# Patient Record
Sex: FEMALE | Race: AMERICAN INDIAN OR ALASKA NATIVE
[De-identification: names, ages, dates, MRNs, and addresses within clinical notes are randomized per-mention and may not be internally consistent; named-entity substitution may affect disease eponyms.]

---

## 2019-08-11 ENCOUNTER — HOSPITAL ENCOUNTER (INPATIENT)
Dept: HOSPITAL 5 - ED | Age: 24
LOS: 2 days | Discharge: LEFT BEFORE DISCHARGE | DRG: 758 | End: 2019-08-13
Attending: INTERNAL MEDICINE | Admitting: INTERNAL MEDICINE
Payer: COMMERCIAL

## 2019-08-11 DIAGNOSIS — N30.00: ICD-10-CM

## 2019-08-11 DIAGNOSIS — N72: Primary | ICD-10-CM

## 2019-08-11 DIAGNOSIS — K52.9: ICD-10-CM

## 2019-08-11 DIAGNOSIS — R65.10: ICD-10-CM

## 2019-08-11 DIAGNOSIS — E86.0: ICD-10-CM

## 2019-08-11 DIAGNOSIS — N73.9: ICD-10-CM

## 2019-08-11 DIAGNOSIS — A54.9: ICD-10-CM

## 2019-08-11 LAB
ALBUMIN SERPL-MCNC: 4 G/DL (ref 3.9–5)
ALT SERPL-CCNC: 12 UNITS/L (ref 7–56)
BACTERIA #/AREA URNS HPF: (no result) /HPF
BAND NEUTROPHILS # (MANUAL): 0.6 K/MM3
BILIRUB UR QL STRIP: (no result)
BLOOD UR QL VISUAL: (no result)
BUN SERPL-MCNC: 13 MG/DL (ref 7–17)
BUN/CREAT SERPL: 14 %
CALCIUM SERPL-MCNC: 9.2 MG/DL (ref 8.4–10.2)
HCT VFR BLD CALC: 36.2 % (ref 30.3–42.9)
HEMOLYSIS INDEX: 6
HGB BLD-MCNC: 12.1 GM/DL (ref 10.1–14.3)
MCHC RBC AUTO-ENTMCNC: 33 % (ref 30–34)
MCV RBC AUTO: 92 FL (ref 79–97)
MUCOUS THREADS #/AREA URNS HPF: (no result) /HPF
MYELOCYTES # (MANUAL): 0 K/MM3
PH UR STRIP: 6 [PH] (ref 5–7)
PLATELET # BLD: 206 K/MM3 (ref 140–440)
PROMYELOCYTES # (MANUAL): 0 K/MM3
RBC # BLD AUTO: 3.92 M/MM3 (ref 3.65–5.03)
RBC #/AREA URNS HPF: 16 /HPF (ref 0–6)
TOTAL CELLS COUNTED BLD: 100
UROBILINOGEN UR-MCNC: < 2 MG/DL (ref ?–2)
WBC #/AREA URNS HPF: > 182 /HPF (ref 0–6)

## 2019-08-11 PROCEDURE — 85007 BL SMEAR W/DIFF WBC COUNT: CPT

## 2019-08-11 PROCEDURE — 87040 BLOOD CULTURE FOR BACTERIA: CPT

## 2019-08-11 PROCEDURE — 84703 CHORIONIC GONADOTROPIN ASSAY: CPT

## 2019-08-11 PROCEDURE — 80053 COMPREHEN METABOLIC PANEL: CPT

## 2019-08-11 PROCEDURE — 76856 US EXAM PELVIC COMPLETE: CPT

## 2019-08-11 PROCEDURE — 81001 URINALYSIS AUTO W/SCOPE: CPT

## 2019-08-11 PROCEDURE — 36415 COLL VENOUS BLD VENIPUNCTURE: CPT

## 2019-08-11 PROCEDURE — 74177 CT ABD & PELVIS W/CONTRAST: CPT

## 2019-08-11 PROCEDURE — 87591 N.GONORRHOEAE DNA AMP PROB: CPT

## 2019-08-11 PROCEDURE — 85025 COMPLETE CBC W/AUTO DIFF WBC: CPT

## 2019-08-11 PROCEDURE — 82140 ASSAY OF AMMONIA: CPT

## 2019-08-11 PROCEDURE — 87210 SMEAR WET MOUNT SALINE/INK: CPT

## 2019-08-11 PROCEDURE — 85027 COMPLETE CBC AUTOMATED: CPT

## 2019-08-11 PROCEDURE — 87086 URINE CULTURE/COLONY COUNT: CPT

## 2019-08-11 PROCEDURE — 87806 HIV AG W/HIV1&2 ANTB W/OPTIC: CPT

## 2019-08-11 PROCEDURE — 83690 ASSAY OF LIPASE: CPT

## 2019-08-11 PROCEDURE — 86592 SYPHILIS TEST NON-TREP QUAL: CPT

## 2019-08-11 PROCEDURE — 99406 BEHAV CHNG SMOKING 3-10 MIN: CPT

## 2019-08-11 PROCEDURE — 74018 RADEX ABDOMEN 1 VIEW: CPT

## 2019-08-11 RX ADMIN — SODIUM CHLORIDE SCH MLS/HR: 0.9 INJECTION, SOLUTION INTRAVENOUS at 23:21

## 2019-08-11 RX ADMIN — CEFEPIME SCH MLS/HR: 2 INJECTION, POWDER, FOR SOLUTION INTRAVENOUS at 22:30

## 2019-08-11 RX ADMIN — Medication SCH ML: at 22:30

## 2019-08-11 RX ADMIN — MORPHINE SULFATE PRN MG: 2 INJECTION, SOLUTION INTRAMUSCULAR; INTRAVENOUS at 23:21

## 2019-08-11 NOTE — CAT SCAN REPORT
CT ABDOMEN AND PELVIS WITH  IV CONTRAST



INDICATION:

suprapubic pain.



COMPARISON:

None available.



TECHNIQUE:

All CT scans at this facility use dose modulation, automated exposure control, iterative reconstructi
on or weight based dosing, when appropriate, to reduce radiation dose to as low as reasonably achieva
ble.



FINDINGS:



Lung Bases: Clear.



Skeletal System: No acute abnormality.



ABDOMEN:

Liver: Normal.

Gallbladder: Normal.  

Bile Ducts: Normal.

Pancreas: Normal.

Spleen: Normal.

Adrenals: Normal.

Right Kidney: Normal.

Left Kidney: Normal.

Stomach and Bowel: Distal loops of small bowel are mildly distended with fluid.

Lymph Nodes: No significant adenopathy.

Aorta: No significant abnormality. 

Additional Findings: None.



PELVIS:

Colon: Proximal colon is fluid-filled. There is no colonic inflammation.

Urinary Bladder and Distal Ureters: Bladder is decompressed, limiting its evaluation.

Appendix: Normal.  

Lymph Nodes: No significant adenopathy.

Additional Findings: There is mild enhancement along the periphery of the uterus. The endometrium donte
ears thickened. Cervix is thickened. There is gas in the vaginal vault. There is mild stranding aroun
d the cervix in the pelvis.





IMPRESSION:

1.  There appears be mild inflammation involving cervix and uterus. Correlate clinically for cervicit
is or pelvic inflammatory disease. No hydrosalpinx is seen.

2.  Distal small bowel is mildly fluid-filled, as is the proximal colon. This could be reactive ileus
 related to the pelvic inflammatory process. Enteritis could have this appearance.



Signer Name: Soham Herrera MD 

Signed: 8/11/2019 2:07 PM

 Workstation Name: [id number]

## 2019-08-11 NOTE — EMERGENCY DEPARTMENT REPORT
ED Abdominal Pain HPI





- General


Chief Complaint: Abdominal Pain


Stated Complaint: ABD PAIN


Time Seen by Provider: 08/11/19 09:40


Source: EMS


Mode of arrival: Stretcher


Limitations: No Limitations





- History of Present Illness


Initial Comments: 


24-year-old female reports the onset of lower abdominal bilateral pain this 

morning.  She states that her period was "different" and ended yesterday but was

on time.  Not exactly sure what she means by different.  She does not refer 

acute vaginal discharge.  She denies fever.  She does refer difficulty with urin

ating and mentioned constipation at triage.  She said no vomiting.  She is not 

currently reporting nausea.  He is a bit emotionally labile at the time of my 

encounter.  However, she is able to tell me that she has never come to the 

emergency department for evaluation of abdominal pain.  She states she had a 

child 2 years ago but no prior abdominal surgery.





MD Complaint: abdominal pain


-: Gradual


Location: LLQ, RLQ


Radiation: none


Migration to: no migration


Severity: moderate, severe


Quality: aching


Consistency: constant


Improves With: nothing


Worsens With: nothing


Associated Symptoms: constipation, dysuria





- Related Data


LMP (females 10-50): this week


                                    Allergies











Allergy/AdvReac Type Severity Reaction Status Date / Time


 


No Known Allergies Allergy   Unverified 08/11/19 09:36














ED Review of Systems


ROS: 


Stated complaint: ABD PAIN


Other details as noted in HPI





Constitutional: denies: chills, fever


Eyes: denies: eye pain, eye discharge, vision change


ENT: denies: ear pain, throat pain


Respiratory: denies: cough, shortness of breath, wheezing


Cardiovascular: denies: chest pain, palpitations


Endocrine: no symptoms reported


Gastrointestinal: abdominal pain.  denies: nausea, diarrhea


Genitourinary: dysuria.  denies: urgency, discharge


Musculoskeletal: denies: back pain, joint swelling, arthralgia


Skin: denies: rash, lesions


Neurological: denies: headache, weakness, paresthesias


Psychiatric: denies: anxiety, depression


Hematological/Lymphatic: denies: easy bleeding, easy bruising





ED Past Medical Hx





- Past Medical History


Previous Medical History?: No





- Surgical History


Past Surgical History?: No





- Social History


Smoking Status: Never Smoker





ED Physical Exam





- General


Limitations: No Limitations


General appearance: alert, in no apparent distress





- Head


Head exam: Present: atraumatic, normocephalic





- Eye


Eye exam: Present: normal appearance.  Absent: scleral icterus





- ENT


ENT exam: Present: mucous membranes moist





- Neck


Neck exam: Present: normal inspection





- Respiratory


Respiratory exam: Present: normal lung sounds bilaterally.  Absent: respiratory 

distress





- Cardiovascular


Cardiovascular Exam: Present: regular rate, normal rhythm.  Absent: systolic 

murmur, diastolic murmur, rubs, gallop





- GI/Abdominal


GI/Abdominal exam: Present: soft, tenderness (lower quadrants, poorly 

localized), normal bowel sounds.  Absent: distended, guarding, rebound, rigid





- 


Speculum exam: Present: normal speculum exam (minimal amount of mucoid 

discharge:)


Bi-manual exam: Present: adnexal tenderness.  Absent: cervical motion tendernes,

uterine enlargement





- Extremities Exam


Extremities exam: Present: normal inspection





- Back Exam


Back exam: Present: normal inspection





- Neurological Exam


Neurological exam: Present: alert, oriented X3, CN II-XII intact.  Absent: motor

sensory deficit





- Psychiatric


Psychiatric exam: Present: normal affect, anxious





- Skin


Skin exam: Present: warm, dry, intact, normal color.  Absent: rash





ED Course


                                   Vital Signs











  08/11/19 08/11/19 08/11/19





  09:34 09:36 10:15


 


Temperature 98.5 F 98 F 


 


Pulse Rate 62 78 


 


Respiratory 17 16 20





Rate   


 


Blood Pressure 127/65  


 


Blood Pressure  106/78 





[Right]   


 


O2 Sat by Pulse 100 97 





Oximetry   














  08/11/19 08/11/19 08/11/19





  10:17 10:30 10:45


 


Temperature   


 


Pulse Rate 79 82 


 


Respiratory 15 20 20





Rate   


 


Blood Pressure  99/41 


 


Blood Pressure   





[Right]   


 


O2 Sat by Pulse 100 100 98





Oximetry   














  08/11/19 08/11/19 08/11/19





  11:00 12:11 13:00


 


Temperature   


 


Pulse Rate 85 88 92 H


 


Respiratory 19 15 21





Rate   


 


Blood Pressure 114/58 115/62 123/68


 


Blood Pressure   





[Right]   


 


O2 Sat by Pulse 99 100 100





Oximetry   














  08/11/19





  14:12


 


Temperature 


 


Pulse Rate 


 


Respiratory 





Rate 


 


Blood Pressure 126/59


 


Blood Pressure 





[Right] 


 


O2 Sat by Pulse 98





Oximetry 














- Reevaluation(s)


Reevaluation #1: 


Persistent suprapubic discomfort.  And mild tenderness on exam.  Regard to give 

the patient ceftriaxone.


Asked the nurses to obtain a urine specimen.  She will go to CT scan.


08/11/19 13:05





Reevaluation #2: 


Patient had episodes of fecal incontinence.  She is volume depleted and having 

diarrhea.  She is really quite uncomfortable.  Her CT was suggestive of ente

ritis versus PID or both.  Her exam was not early consistent with PID.  She 

certainly has acute cystitis.  She'll be admitted for intravenous antibiotic 

therapy IV fluids and further evaluation by hospitalist service.


08/11/19 15:39











ED Medical Decision Making





- Lab Data


Result diagrams: 


                                 08/11/19 09:48





                                 08/11/19 09:48








                         Laboratory Results - last 24 hr











  08/11/19 08/11/19 08/11/19





  09:48 09:48 09:48


 


WBC  14.2 H  


 


RBC  3.92  


 


Hgb  12.1  


 


Hct  36.2  


 


MCV  92  


 


MCH  31  


 


MCHC  33  


 


RDW  13.6  


 


Plt Count  206  


 


Add Manual Diff  Complete  


 


Total Counted  100  


 


Seg Neutrophils %  Np  


 


Seg Neuts % (Manual)  91.0 H  


 


Band Neutrophils %  4.0  


 


Lymphocytes % (Manual)  4.0 L  


 


Reactive Lymphs % (Man)  0  


 


Monocytes % (Manual)  1.0  


 


Eosinophils % (Manual)  0  


 


Basophils % (Manual)  0  


 


Metamyelocytes %  0  


 


Myelocytes %  0  


 


Promyelocytes %  0  


 


Blast Cells %  0  


 


Nucleated RBC %  Not Reportable  


 


Seg Neutrophils # Man  12.9 H  


 


Band Neutrophils #  0.6  


 


Lymphocytes # (Manual)  0.6 L  


 


Abs React Lymphs (Man)  0.0  


 


Monocytes # (Manual)  0.1  


 


Eosinophils # (Manual)  0.0  


 


Basophils # (Manual)  0.0  


 


Metamyelocytes #  0.0  


 


Myelocytes #  0.0  


 


Promyelocytes #  0.0  


 


Blast Cells #  0.0  


 


WBC Morphology  Not Reportable  


 


Hypersegmented Neuts  Not Reportable  


 


Hyposegmented Neuts  Not Reportable  


 


Hypogranular Neuts  Not Reportable  


 


Smudge Cells  Not Reportable  


 


Toxic Granulation  Not Reportable  


 


Toxic Vacuolation  Not Reportable  


 


Dohle Bodies  Not Reportable  


 


Pelger-Huet Anomaly  Not Reportable  


 


Francis Rods  Not Reportable  


 


Platelet Estimate  Consistent w auto  


 


Clumped Platelets  Not Reportable  


 


Plt Clumps, EDTA  Not Reportable  


 


Large Platelets  Not Reportable  


 


Giant Platelets  Not Reportable  


 


Platelet Satelliting  Not Reportable  


 


Plt Morphology Comment  Not Reportable  


 


RBC Morphology  Normal  


 


Dimorphic RBCs  Not Reportable  


 


Polychromasia  Not Reportable  


 


Hypochromasia  Not Reportable  


 


Poikilocytosis  Not Reportable  


 


Anisocytosis  Not Reportable  


 


Microcytosis  Not Reportable  


 


Macrocytosis  Not Reportable  


 


Spherocytes  Not Reportable  


 


Pappenheimer Bodies  Not Reportable  


 


Sickle Cells  Not Reportable  


 


Target Cells  Not Reportable  


 


Tear Drop Cells  Not Reportable  


 


Ovalocytes  Not Reportable  


 


Helmet Cells  Not Reportable  


 


[place] Bodies  Not Reportable  


 


Cabot Rings  Not Reportable  


 


Savery Cells  Not Reportable  


 


Bite Cells  Not Reportable  


 


Crenated Cell  Not Reportable  


 


Elliptocytes  Not Reportable  


 


Acanthocytes (Spur)  Not Reportable  


 


Rouleaux  Not Reportable  


 


Hemoglobin C Crystals  Not Reportable  


 


Schistocytes  Not Reportable  


 


Malaria parasites  Not Reportable  


 


Juan Manuel Bodies  Not Reportable  


 


Hem Pathologist Commnt  No  


 


Sodium   


 


Potassium   


 


Chloride   


 


Carbon Dioxide   


 


Anion Gap   


 


BUN   


 


Creatinine   


 


Estimated GFR   


 


BUN/Creatinine Ratio   


 


Glucose   


 


Calcium   


 


Total Bilirubin   


 


AST   


 


ALT   


 


Alkaline Phosphatase   


 


Total Protein   


 


Albumin   


 


Albumin/Globulin Ratio   


 


Lipase   21 


 


HCG, Qual    Negative


 


Urine Color   


 


Urine Turbidity   


 


Urine pH   


 


Ur Specific Gravity   


 


Urine Protein   


 


Urine Glucose (UA)   


 


Urine Ketones   


 


Urine Blood   


 


Urine Nitrite   


 


Urine Bilirubin   


 


Urine Urobilinogen   


 


Ur Leukocyte Esterase   


 


Urine WBC (Auto)   


 


Urine RBC (Auto)   


 


U Epithel Cells (Auto)   


 


Urine Bacteria (Auto)   


 


Urine Mucus   














  08/11/19 08/11/19





  09:48 13:30


 


WBC  


 


RBC  


 


Hgb  


 


Hct  


 


MCV  


 


MCH  


 


MCHC  


 


RDW  


 


Plt Count  


 


Add Manual Diff  


 


Total Counted  


 


Seg Neutrophils %  


 


Seg Neuts % (Manual)  


 


Band Neutrophils %  


 


Lymphocytes % (Manual)  


 


Reactive Lymphs % (Man)  


 


Monocytes % (Manual)  


 


Eosinophils % (Manual)  


 


Basophils % (Manual)  


 


Metamyelocytes %  


 


Myelocytes %  


 


Promyelocytes %  


 


Blast Cells %  


 


Nucleated RBC %  


 


Seg Neutrophils # Man  


 


Band Neutrophils #  


 


Lymphocytes # (Manual)  


 


Abs React Lymphs (Man)  


 


Monocytes # (Manual)  


 


Eosinophils # (Manual)  


 


Basophils # (Manual)  


 


Metamyelocytes #  


 


Myelocytes #  


 


Promyelocytes #  


 


Blast Cells #  


 


WBC Morphology  


 


Hypersegmented Neuts  


 


Hyposegmented Neuts  


 


Hypogranular Neuts  


 


Smudge Cells  


 


Toxic Granulation  


 


Toxic Vacuolation  


 


Dohle Bodies  


 


Pelger-Huet Anomaly  


 


Francis Rods  


 


Platelet Estimate  


 


Clumped Platelets  


 


Plt Clumps, EDTA  


 


Large Platelets  


 


Giant Platelets  


 


Platelet Satelliting  


 


Plt Morphology Comment  


 


RBC Morphology  


 


Dimorphic RBCs  


 


Polychromasia  


 


Hypochromasia  


 


Poikilocytosis  


 


Anisocytosis  


 


Microcytosis  


 


Macrocytosis  


 


Spherocytes  


 


Pappenheimer Bodies  


 


Sickle Cells  


 


Target Cells  


 


Tear Drop Cells  


 


Ovalocytes  


 


Helmet Cells  


 


[place] Bodies  


 


Cabot Rings  


 


Yordy Cells  


 


Bite Cells  


 


Crenated Cell  


 


Elliptocytes  


 


Acanthocytes (Spur)  


 


Rouleaux  


 


Hemoglobin C Crystals  


 


Schistocytes  


 


Malaria parasites  


 


Juan Manuel Bodies  


 


Hem Pathologist Commnt  


 


Sodium  138 


 


Potassium  4.3 


 


Chloride  104.5 


 


Carbon Dioxide  22 


 


Anion Gap  16 


 


BUN  13 


 


Creatinine  0.9 


 


Estimated GFR  > 60 


 


BUN/Creatinine Ratio  14 


 


Glucose  116 H 


 


Calcium  9.2 


 


Total Bilirubin  0.60 


 


AST  15 


 


ALT  12 


 


Alkaline Phosphatase  55 


 


Total Protein  6.9 


 


Albumin  4.0 


 


Albumin/Globulin Ratio  1.4 


 


Lipase  


 


HCG, Qual  


 


Urine Color   Yellow


 


Urine Turbidity   Cloudy


 


Urine pH   6.0


 


Ur Specific Gravity   1.013


 


Urine Protein   30 mg/dl


 


Urine Glucose (UA)   Neg


 


Urine Ketones   20


 


Urine Blood   Mod


 


Urine Nitrite   Neg


 


Urine Bilirubin   Neg


 


Urine Urobilinogen   < 2.0


 


Ur Leukocyte Esterase   Lg


 


Urine WBC (Auto)   > 182.0 H


 


Urine RBC (Auto)   16.0


 


U Epithel Cells (Auto)   9.0


 


Urine Bacteria (Auto)   1+


 


Urine Mucus   Few














- Radiology Data





IMPRESSION: 


1. There appears be mild inflammation involving cervix and uterus. Correlate 

clinically for 


cervicitis or pelvic inflammatory disease. No hydrosalpinx is seen. 


2. Distal small bowel is mildly fluid-filled, as is the proximal colon. This 

could be reactive 


ileus related to the pelvic inflammatory process. Enteritis could have this 

appearance. 





Signer Name: Soham Herrera MD 


Ultrasound no acute process


Critical care attestation.: 


If time is entered above; I have spent that time in minutes in the direct care 

of this critically ill patient, excluding procedure time.








ED Disposition


Clinical Impression: 


 Enteritis, Dehydration





Acute cystitis


Qualifiers:


 Hematuria presence: with hematuria Qualified Code(s): N30.01 - Acute cystitis 

with hematuria





Disposition: DC-09 OP ADMIT IP TO THIS HOSP


Is pt being admited?: Yes


Does the pt Need Aspirin: No


Condition: Stable


Instructions:  Abdominal Pain (ED)


Referrals: 


[facility], [place] [Primary Care Provider] - 3-5 Days


Time of Disposition: 15:42

## 2019-08-11 NOTE — ULTRASOUND REPORT
ULTRASOUND PELVIS  



INDICATION / CLINICAL INFORMATION: 

lower abd pain.



TECHNIQUE:

Transabdominal.

Duplex Color Doppler used: Yes. 



COMPARISON: 

None available



FINDINGS:

UTERUS: Present.  

- Appearance (if present): No significant abnormality.

- Size in cm (if present): 11.8 x 5.6 x 5.9. 

- Endometrial Complex (if present): No significant abnormality.. Thickness in cm (if measured) = 1.3

- Mass lesions: None.

- Additional findings: None.



RIGHT ADNEXA: Small 1 cm right ovarian cyst. Normal color Doppler blood flow.



LEFT ADNEXA: No significant ovarian cyst or mass. Normal color Doppler blood flow.



URINARY BLADDER: No significant abnormality. 

FREE FLUID: None.

ADDITIONAL FINDINGS: None.



IMPRESSION:

1. No acute sonographic abnormality of the pelvis.

2. 1 cm right ovarian cyst is almost certainly benign and requires no follow-up.



Signer Name: AG Fontaine MD 

Signed: 8/11/2019 12:48 PM

 Workstation Name: [id number]

## 2019-08-11 NOTE — HISTORY AND PHYSICAL REPORT
History of Present Illness


Chief complaint: 





Im hurting down there


History of present illness: 


25 YO Female with no PMH presents to ED for evaluation. Pt states that she has 

experienced pain in her abdomen over the past 1 day with persistently worsening 

symptoms over the past 8 hours. Pt states that her LNMP began 6 days ago, and 

ended yesterday, but "was Different". Pt acknowledges thick vaginal discharge, 

as well as pain with urination. 








24-year-old female reports the onset of lower abdominal bilateral pain this 

morning.  She states that her period was "different" and ended yesterday but was

on time.  Not exactly sure what she means by different.  She does not refer 

acute vaginal discharge.  She denies fever.  She does refer difficulty with 

urinating and mentioned constipation at triage.  She said no vomiting.  She is 

not currently reporting nausea.  He is a bit emotionally labile at the time of 

my encounter.  However, she is able to tell me that she has never come to the 

emergency department for evaluation of abdominal pain.  She states she had a 

child 2 years ago but no prior abdominal surgery.





MD Complaint: abdominal pain


-: Gradual


Location: LLQ, RLQ


Radiation: none


Migration to: no migration


Severity: moderate, severe


Quality: aching


Consistency: constant


Improves With: nothing


Worsens With: nothing


Associated Symptoms: constipation, dysuria





- Related Data


LMP (females 10-50): this week


                                    Allergies











Allergy/AdvReac Type Severity Reaction Status Date / Time


 


No Known Allergies Allergy   Unverified 08/11/19 09:36














ED Review of Systems


ROS: 


Stated complaint: ABD PAIN


Other details as noted in HPI





Constitutional: denies: chills, fever


Eyes: denies: eye pain, eye discharge, vision change


ENT: denies: ear pain, throat pain


Respiratory: denies: cough, shortness of breath, wheezing


Cardiovascular: denies: chest pain, palpitations


Endocrine: no symptoms reported


Gastrointestinal: abdominal pain.  denies: nausea, diarrhea


Genitourinary: dysuria.  denies: urgency, discharge


Musculoskeletal: denies: back pain, joint swelling, arthralgia


Skin: denies: rash, lesions


Neurological: denies: headache, weakness, paresthesias


Psychiatric: denies: anxiety, depression


Hematological/Lymphatic: denies: easy bleeding, easy bruising





ED Past Medical Hx





- Past Medical History


Previous Medical History?: No





- Surgical History


Past Surgical History?: No





- Social History


Smoking Status: Never Smoker








Past History


Past Medical History: No medical history, other (reviewed)


Past Surgical History: No surgical history, Other (Reviewed)


Social history: single.  denies: smoking, alcohol abuse, prescription drug abuse


Family history: no significant family history (reviewed)





Medications and Allergies


                                    Allergies











Allergy/AdvReac Type Severity Reaction Status Date / Time


 


No Known Allergies Allergy   Unverified 08/11/19 09:36














Review of Systems


Constitutional: no weight loss


Ears, nose, mouth and throat: no ear pain, no ear discharge, no decreased 

hearing, no nasal congestion, no nasal discharge


Breasts: no change in shape, no swelling, no mass


Cardiovascular: no chest pain, no orthopnea, no palpitations, no rapid/irregular

heart beat, no edema


Respiratory: no cough, no cough with sputum, no hemoptysis, no shortness of 

breath


Gastrointestinal: no abdominal pain, no vomiting, no diarrhea, no hematemesis


Genitourinary Female: pelvic pain, dysuria, vaginal discharge, no urinary 

frequency, no stress incontinence, no post void dribbling, no vaginal itching, 

no vaginal odor, no genital sores, no vaginal dryness


Rectal: no pain, no incontinence, no bleeding


Musculoskeletal: no neck stiffness, no neck pain, no shooting arm pain, no arm 

numbness/tingling, no low back pain, no leg numbness/tingling


Integumentary: no rash, no redness, no sores, no wounds


Neurological: no head injury, no paralysis, no weakness, no numbness, no 

tingling, no syncope, no tremors


Psychiatric: no anxiety, no memory loss, no change in sleep habits, no sleep 

disturbances, no insomnia, no hypersomnia





Exam





- Constitutional


Vitals: 


                                        











Temp Pulse Resp BP Pulse Ox


 


 98 F   92 H  21   126/59   98 


 


 08/11/19 09:36  08/11/19 13:00  08/11/19 13:00  08/11/19 14:12  08/11/19 14:12











General appearance: Present: mild distress





- EENT


Eyes: Present: PERRL


ENT: hearing intact, clear oral mucosa





- Neck


Neck: Present: supple, normal ROM





- Respiratory


Respiratory effort: normal


Respiratory: bilateral: CTA





- Cardiovascular


Heart Sounds: Present: S1 & S2.  Absent: rub, click





- Extremities


Extremities: pulses symmetrical, No edema


Peripheral Pulses: within normal limits





- Abdominal


General gastrointestinal: Present: soft, tender, non-distended, normal bowel 

sounds, other (mild suprapubic tenderness).  Absent: absent bowel sounds, 

hepatomegaly, splenomegaly


Female genitourinary: Present: normal





- Integumentary


Integumentary: Present: clear, warm, dry





- Musculoskeletal


Musculoskeletal: gait normal, strength equal bilaterally





- Psychiatric


Psychiatric: appropriate mood/affect, intact judgment & insight





- Neurologic


Neurologic: CNII-XII intact, moves all extremities





Results





- Labs


CBC & Chem 7: 


                                 08/11/19 09:48





                                 08/11/19 09:48


Labs: 


                              Abnormal lab results











  08/11/19 08/11/19 08/11/19 Range/Units





  09:48 09:48 13:30 


 


WBC  14.2 H    (4.5-11.0)  K/mm3


 


Seg Neuts % (Manual)  91.0 H    (40.0-70.0)  %


 


Lymphocytes % (Manual)  4.0 L    (13.4-35.0)  %


 


Seg Neutrophils # Man  12.9 H    (1.8-7.7)  K/mm3


 


Lymphocytes # (Manual)  0.6 L    (1.2-5.4)  K/mm3


 


Glucose   116 H   ()  mg/dL


 


Urine WBC (Auto)    > 182.0 H  (0.0-6.0)  /HPF














Assessment and Plan





- Patient Problems


(1) UTI (urinary tract infection)


Current Visit: Yes   Status: Acute   


Qualifiers: 


   Encounter type: initial encounter 


Plan to address problem: 


IV antibiotic therapy, urinalysis, CBC, CMP, supportive care. 








(2) SIRS (systemic inflammatory response syndrome)


Current Visit: Yes   Status: Acute   


Plan to address problem: 


IV antibiotic therapy, GC/Chlamydia,CBC, CMP, chest x ray, serial lactic acid, 

CT Abdomen, Pelvic Ultrasound.








(3) Cervicitis


Current Visit: Yes   Status: Acute   


Plan to address problem: 


Discussed patient with GYN physician on call. Recommend hospitalist service 

admit, and continue abx therapy. No further GYN input.








(4) DVT prophylaxis


Current Visit: Yes   Status: Acute   


Plan to address problem: 


SCD to BLE while in bed

## 2019-08-12 LAB
HCT VFR BLD CALC: 31.5 % (ref 30.3–42.9)
HGB BLD-MCNC: 10.3 GM/DL (ref 10.1–14.3)
MCHC RBC AUTO-ENTMCNC: 33 % (ref 30–34)
MCV RBC AUTO: 92 FL (ref 79–97)
PLATELET # BLD: 165 K/MM3 (ref 140–440)
RBC # BLD AUTO: 3.44 M/MM3 (ref 3.65–5.03)

## 2019-08-12 RX ADMIN — METRONIDAZOLE SCH MLS/HR: 5 INJECTION, SOLUTION INTRAVENOUS at 21:50

## 2019-08-12 RX ADMIN — Medication SCH ML: at 10:00

## 2019-08-12 RX ADMIN — MORPHINE SULFATE PRN MG: 2 INJECTION, SOLUTION INTRAMUSCULAR; INTRAVENOUS at 21:49

## 2019-08-12 RX ADMIN — Medication SCH ML: at 21:50

## 2019-08-12 RX ADMIN — CEFEPIME SCH MLS/HR: 2 INJECTION, POWDER, FOR SOLUTION INTRAVENOUS at 10:00

## 2019-08-12 RX ADMIN — METRONIDAZOLE SCH MLS/HR: 5 INJECTION, SOLUTION INTRAVENOUS at 12:00

## 2019-08-12 RX ADMIN — MORPHINE SULFATE PRN MG: 2 INJECTION, SOLUTION INTRAMUSCULAR; INTRAVENOUS at 15:56

## 2019-08-12 RX ADMIN — CEFEPIME SCH MLS/HR: 2 INJECTION, POWDER, FOR SOLUTION INTRAVENOUS at 21:50

## 2019-08-12 RX ADMIN — SODIUM CHLORIDE SCH MLS/HR: 0.9 INJECTION, SOLUTION INTRAVENOUS at 07:46
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
## 2019-08-12 NOTE — XRAY REPORT
. ABDOMEN 1 VIEW(S)



INDICATION / CLINICAL INFORMATION:

ILIUS.



COMPARISON: 

None available.



FINDINGS:



TUBES / LINES: None.

BOWEL GAS PATTERN/EXTRALUMINAL GAS: No significant abnormality. No pneumatosis or secondary signs of 
free air.



ADDITIONAL FINDINGS: No significant additional findings.



IMPRESSION:

1. No significant abnormality.



Signer Name: Rudy Donovan MD 

Signed: 8/12/2019 11:08 AM

 Workstation Name: [id number]

## 2019-08-13 VITALS — DIASTOLIC BLOOD PRESSURE: 61 MMHG | SYSTOLIC BLOOD PRESSURE: 133 MMHG

## 2019-08-13 LAB
BASOPHILS # (AUTO): 0.1 K/MM3 (ref 0–0.1)
BASOPHILS NFR BLD AUTO: 0.7 % (ref 0–1.8)
EOSINOPHIL # BLD AUTO: 0.1 K/MM3 (ref 0–0.4)
EOSINOPHIL NFR BLD AUTO: 0.8 % (ref 0–4.3)
HCT VFR BLD CALC: 30 % (ref 30.3–42.9)
HGB BLD-MCNC: 10.1 GM/DL (ref 10.1–14.3)
LYMPHOCYTES # BLD AUTO: 1.2 K/MM3 (ref 1.2–5.4)
LYMPHOCYTES NFR BLD AUTO: 8.4 % (ref 13.4–35)
MCHC RBC AUTO-ENTMCNC: 34 % (ref 30–34)
MCV RBC AUTO: 91 FL (ref 79–97)
MONOCYTES # (AUTO): 0.5 K/MM3 (ref 0–0.8)
MONOCYTES % (AUTO): 3.7 % (ref 0–7.3)
PLATELET # BLD: 168 K/MM3 (ref 140–440)
RBC # BLD AUTO: 3.3 M/MM3 (ref 3.65–5.03)

## 2019-08-13 RX ADMIN — CEFEPIME SCH MLS/HR: 2 INJECTION, POWDER, FOR SOLUTION INTRAVENOUS at 10:47

## 2019-08-13 RX ADMIN — MORPHINE SULFATE PRN MG: 2 INJECTION, SOLUTION INTRAMUSCULAR; INTRAVENOUS at 01:45

## 2019-08-13 RX ADMIN — Medication SCH ML: at 10:46

## 2019-08-13 RX ADMIN — METRONIDAZOLE SCH MLS/HR: 5 INJECTION, SOLUTION INTRAVENOUS at 13:38

## 2019-08-13 RX ADMIN — METRONIDAZOLE SCH MLS/HR: 5 INJECTION, SOLUTION INTRAVENOUS at 05:39
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
## 2019-08-14 NOTE — EVENT NOTE
Date: 08/14/19





+GC noted, spoke with Dr. Smith who will contact patient for evaluation and 

possible additional treatment

## 2019-08-14 NOTE — EVENT NOTE
Date: 08/14/19


Patient's GC DNA is positive


Called Ms.Esquivel Sanabria  at [phone] and discussed the positive test 

report


Discussed the importance of immediate treatment and further evaluation, 


Advised to see [facility] ASAP or ID specialist for the 

treatment


and further evaluation.Patient verbalized understanding and agreed to follow the

instructions.





I also advised her to see ID specialist ASAP and gave the below information. 


Dr.Kairav Judah GREGORY [ ID ]


[facility]


Office; [phone]
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
